# Patient Record
Sex: FEMALE | Race: WHITE | URBAN - METROPOLITAN AREA
[De-identification: names, ages, dates, MRNs, and addresses within clinical notes are randomized per-mention and may not be internally consistent; named-entity substitution may affect disease eponyms.]

---

## 2021-11-02 ENCOUNTER — OFFICE VISIT (OUTPATIENT)
Dept: PRIMARY CARE CLINIC | Age: 26
End: 2021-11-02
Payer: OTHER GOVERNMENT

## 2021-11-02 VITALS
HEART RATE: 73 BPM | WEIGHT: 134.6 LBS | DIASTOLIC BLOOD PRESSURE: 64 MMHG | OXYGEN SATURATION: 98 % | SYSTOLIC BLOOD PRESSURE: 104 MMHG | HEIGHT: 65 IN | BODY MASS INDEX: 22.42 KG/M2 | RESPIRATION RATE: 14 BRPM

## 2021-11-02 DIAGNOSIS — Z13.29 SCREENING FOR THYROID DISORDER: ICD-10-CM

## 2021-11-02 DIAGNOSIS — F41.9 ANXIETY: Primary | ICD-10-CM

## 2021-11-02 DIAGNOSIS — Z3A.01 LESS THAN 8 WEEKS GESTATION OF PREGNANCY: ICD-10-CM

## 2021-11-02 PROCEDURE — 99204 OFFICE O/P NEW MOD 45 MIN: CPT | Performed by: NURSE PRACTITIONER

## 2021-11-02 SDOH — ECONOMIC STABILITY: FOOD INSECURITY: WITHIN THE PAST 12 MONTHS, YOU WORRIED THAT YOUR FOOD WOULD RUN OUT BEFORE YOU GOT MONEY TO BUY MORE.: NEVER TRUE

## 2021-11-02 SDOH — ECONOMIC STABILITY: FOOD INSECURITY: WITHIN THE PAST 12 MONTHS, THE FOOD YOU BOUGHT JUST DIDN'T LAST AND YOU DIDN'T HAVE MONEY TO GET MORE.: NEVER TRUE

## 2021-11-02 ASSESSMENT — ENCOUNTER SYMPTOMS
BLOOD IN STOOL: 0
VOMITING: 0
TROUBLE SWALLOWING: 0
WHEEZING: 0
SHORTNESS OF BREATH: 0
SINUS PRESSURE: 0
COUGH: 0
CONSTIPATION: 0
SORE THROAT: 0
ABDOMINAL PAIN: 0
NAUSEA: 1
DIARRHEA: 0

## 2021-11-02 ASSESSMENT — PATIENT HEALTH QUESTIONNAIRE - PHQ9
2. FEELING DOWN, DEPRESSED OR HOPELESS: 0
SUM OF ALL RESPONSES TO PHQ QUESTIONS 1-9: 0
SUM OF ALL RESPONSES TO PHQ9 QUESTIONS 1 & 2: 0
SUM OF ALL RESPONSES TO PHQ QUESTIONS 1-9: 0
1. LITTLE INTEREST OR PLEASURE IN DOING THINGS: 0
SUM OF ALL RESPONSES TO PHQ QUESTIONS 1-9: 0

## 2021-11-02 ASSESSMENT — SOCIAL DETERMINANTS OF HEALTH (SDOH): HOW HARD IS IT FOR YOU TO PAY FOR THE VERY BASICS LIKE FOOD, HOUSING, MEDICAL CARE, AND HEATING?: NOT HARD AT ALL

## 2021-11-02 NOTE — PROGRESS NOTES
[address] PRIMARY CARE  [address]  [address]  [address] [address]  Dept: [phone]  Dept Fax: [phone]    Tristin Hassan is a 32 y.o. female who presents today for her medical conditions/complaints as noted below.   Tristin Hassan is c/o of  Chief Complaint   Patient presents with    New Patient     Establish Care, Discuss medication         HPI:     Here today to establish care as new patient  Has recently moved to the area from [place] due to spouse's job with [employer]  She is newly pregnant  Minimal past medical hx other than recent anxiety  Reports was put on lexapro 6 months ago for sx of anxiety and worry while  was out of the country on deployment  The medication was helpful, she recalls was on [facility] [place] dose\"  Stopped the lexapro 2 weeks ago when found out she was pregnant, she did not wean off of the medication  Was experiencing some withdrawal sx but thought was maybe just \"morning sickness\"  She states overall she feels better but remains with morning nausea and some tiredness  Denies any vomiting  Will eat toast or crackers first thing in the morning and this is helpful  She has appt with OBGYN office educator in 2 days, will see Dr in 2 weeks  This is her first pregnancy and she has several questions    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  Tries to follow healthy diet and keep active with regular exercise and walking          No results found for: LABA1C          ( goal A1C is < 7)   No results found for: LABMICR  No results found for: LDLCHOLESTEROL, [address]    (goal LDL is <100)   No results found for: AST, ALT, BUN  BP Readings from Last 3 Encounters:   21 104/64          ([id number])    Past Medical History:   Diagnosis Date    Anxiety       Past Surgical History:   Procedure Laterality Date    CYST REMOVAL  2016    on left hand    WISDOM TOOTH EXTRACTION  2019       Family History   Problem Relation Age of Onset    Anxiety Disorder Mother     Cancer Maternal Grandmother         Skin    Diabetes Maternal Grandfather     Diabetes Paternal Grandfather           Social History     Tobacco Use    Smoking status: Never Smoker    Smokeless tobacco: Never Used   Substance Use Topics    Alcohol use: Not on file     Comment: Occ. Current Outpatient Medications   Medication Sig Dispense Refill    Prenatal MV & Min w/FA-DHA ([facility] PRENATAL GUMMY PO) Take by mouth      Pyridoxine HCl (VITAMIN B6 PO) Take by mouth      Doxylamine Succinate, Sleep, (UNISOM PO) Take by mouth       No current facility-administered medications for this visit. No Known Allergies    Health Maintenance   Topic Date Due    Hepatitis C screen  Never done    Varicella vaccine (1 of 2 - 2-dose childhood series) Never done    HPV vaccine (1 - 2-dose series) Never done    HIV screen  Never done    DTaP/Tdap/Td vaccine (1 - Tdap) Never done    Pap smear  Never done    Flu vaccine (1) 11/02/2022 (Originally 9/1/2021)    COVID-19 Vaccine  Completed    Hepatitis A vaccine  Aged Out    Hepatitis B vaccine  Aged Out    Hib vaccine  Aged Out    Meningococcal (ACWY) vaccine  Aged Out    Pneumococcal 0-64 years Vaccine  Aged Out       Subjective:      Review of Systems   Constitutional: Negative for activity change, appetite change, chills, fatigue, fever and unexpected weight change. HENT: Negative for congestion, ear pain, hearing loss, sinus pressure, sore throat and trouble swallowing. Eyes: Negative for visual disturbance. Respiratory: Negative for cough, shortness of breath and wheezing. Cardiovascular: Negative for chest pain, palpitations and leg swelling. Gastrointestinal: Positive for nausea (due to pregnancy). Negative for abdominal pain, blood in stool, constipation, diarrhea and vomiting. Endocrine: Negative for cold intolerance, heat intolerance, polydipsia, polyphagia and polyuria.    Genitourinary: Negative for difficulty urinating, frequency, hematuria and urgency. Musculoskeletal: Negative for arthralgias and myalgias. Skin: Negative for rash. Allergic/Immunologic: Negative for environmental allergies. Neurological: Negative for dizziness, weakness, light-headedness and headaches. Psychiatric/Behavioral: Negative for confusion. The patient is not nervous/anxious. Objective:     Physical Exam  Constitutional:       Appearance: She is well-developed. HENT:      Head: Normocephalic. Eyes:      Conjunctiva/sclera: Conjunctivae normal.      Pupils: Pupils are equal, round, and reactive to light. Cardiovascular:      Rate and Rhythm: Normal rate and regular rhythm. Heart sounds: Normal heart sounds. No murmur heard. Pulmonary:      Effort: Pulmonary effort is normal.      Breath sounds: Normal breath sounds. No wheezing. Abdominal:      General: Bowel sounds are normal. There is no distension. Palpations: Abdomen is soft. Musculoskeletal:         General: Normal range of motion. Cervical back: Normal range of motion. Skin:     General: Skin is warm and dry. Neurological:      Mental Status: She is alert and oriented to person, place, and time. Psychiatric:         Behavior: Behavior normal.         Thought Content: Thought content normal.         Judgment: Judgment normal.       /64   Pulse 73   Resp 14   Ht 5' 5\" (1.651 m)   Wt 134 lb 9.6 oz (61.1 kg)   SpO2 98%   BMI 22.40 kg/m²     Assessment:       Diagnosis Orders   1. Anxiety     2. Less than 8 weeks gestation of pregnancy  CBC Auto Differential    Comprehensive Metabolic Panel    TSH without Reflex   3. Screening for thyroid disorder  TSH without Reflex             Plan:      Return if symptoms worsen or fail to improve.     Orders Placed This Encounter   Procedures    CBC Auto Differential     Standing Status:   Future     Standing Expiration Date:   11/2/2022    Comprehensive Metabolic Panel     Standing Status:   Future Standing Expiration Date:   11/2/2022    TSH without Reflex     Standing Status:   Future     Standing Expiration Date:   11/2/2022     Established care as new patient  She is interested in screening labs  Reviewed healthy diet choices and benefits of continuing regular exercise  Pregnant-she has appt later this week with new OBGYN office with their nurse educator and will see  in 2 weeks. questions answered, cont prenatal MVI  Anxiety-off lexapro, discussed resuming or trying zoloft is necessary. Advised to to discussed with OBGYN and return to office if decides tx if needed. At this time she states she feels her sx are not an issue, the anxiety was situational when she started the med and things have changed since then   Patient given educational materials - see patient instructions. Discussed use, benefit, and side effects of prescribed medications. All patientquestions answered. Pt voiced understanding. Reviewed health maintenance. Instructedto continue current medications, diet and exercise. Patient agreed with treatmentplan. Follow up as directed.      Electronicallysigned by ESTUARDO Mathur CNP on 11/2/2021 at 8:07 PM

## 2021-11-11 ENCOUNTER — TELEPHONE (OUTPATIENT)
Dept: PRIMARY CARE CLINIC | Age: 26
End: 2021-11-11

## 2021-11-11 DIAGNOSIS — Z3A.01 LESS THAN 8 WEEKS GESTATION OF PREGNANCY: Primary | ICD-10-CM

## 2021-11-11 NOTE — TELEPHONE ENCOUNTER
Referral placed to [address] [place] [address], Dr Pebbles Cerna, as this is who she told me she was already scheduled with. However she needs to understand the left her insurance company to determine their preferred providers and if this group is not on their list she will need to provide us with the name of the new group and we can then refer there.   Thanks

## 2021-11-11 NOTE — TELEPHONE ENCOUNTER
Patient called stating that she is having trouble with her [employer] She states that they will not allow the referral she received from the Urgent Care for OBGYN. She states she has an upcoming appointment and is asking if you are able to send a referral and see if they will approve it coming from her PCP.

## 2021-11-12 NOTE — TELEPHONE ENCOUNTER
Pt called back in regards to referral status. Advised that PCP placed a referral to Dr. Ghada Osei. Pt stated that they spoke with their insurance company yesterday and they told them that Dr. Ghada Osei is one their list as covered physician. Pt says they would need the referral faxed to [facility] and provided fax number [phone]. Referral faxed.

## 2021-11-15 LAB — TSH SERPL DL<=0.05 MIU/L-ACNC: 1.63 UIU/ML

## 2021-11-16 DIAGNOSIS — Z13.29 SCREENING FOR THYROID DISORDER: ICD-10-CM

## 2021-11-16 DIAGNOSIS — Z3A.01 LESS THAN 8 WEEKS GESTATION OF PREGNANCY: ICD-10-CM

## 2022-08-11 ENCOUNTER — OFFICE VISIT (OUTPATIENT)
Dept: PRIMARY CARE CLINIC | Age: 27
End: 2022-08-11
Payer: OTHER GOVERNMENT

## 2022-08-11 VITALS
WEIGHT: 152.8 LBS | DIASTOLIC BLOOD PRESSURE: 72 MMHG | BODY MASS INDEX: 25.43 KG/M2 | RESPIRATION RATE: 16 BRPM | SYSTOLIC BLOOD PRESSURE: 116 MMHG | HEART RATE: 70 BPM | OXYGEN SATURATION: 98 %

## 2022-08-11 DIAGNOSIS — M25.531 PAIN IN BOTH WRISTS: Primary | ICD-10-CM

## 2022-08-11 DIAGNOSIS — R49.9 CHANGE IN VOICE: ICD-10-CM

## 2022-08-11 DIAGNOSIS — M25.532 PAIN IN BOTH WRISTS: Primary | ICD-10-CM

## 2022-08-11 DIAGNOSIS — L70.0 CYSTIC ACNE: ICD-10-CM

## 2022-08-11 DIAGNOSIS — K21.9 GASTROESOPHAGEAL REFLUX DISEASE, UNSPECIFIED WHETHER ESOPHAGITIS PRESENT: ICD-10-CM

## 2022-08-11 PROCEDURE — 99214 OFFICE O/P EST MOD 30 MIN: CPT | Performed by: NURSE PRACTITIONER

## 2022-08-11 RX ORDER — CLINDAMYCIN AND BENZOYL PEROXIDE 10; 50 MG/G; MG/G
GEL TOPICAL
Qty: 35 G | Refills: 2 | Status: SHIPPED | OUTPATIENT
Start: 2022-08-11

## 2022-08-11 RX ORDER — SERTRALINE HYDROCHLORIDE 25 MG/1
25 TABLET, FILM COATED ORAL DAILY
COMMUNITY
Start: 2022-07-15

## 2022-08-11 RX ORDER — LORATADINE 10 MG/1
10 TABLET ORAL DAILY
COMMUNITY

## 2022-08-11 RX ORDER — OMEPRAZOLE 20 MG/1
20 CAPSULE, DELAYED RELEASE ORAL 2 TIMES DAILY
Qty: 60 CAPSULE | Refills: 5 | Status: SHIPPED | OUTPATIENT
Start: 2022-08-11

## 2022-08-11 ASSESSMENT — ENCOUNTER SYMPTOMS
NAUSEA: 0
COUGH: 0
DIARRHEA: 0
SORE THROAT: 0
WHEEZING: 0
CONSTIPATION: 0
ABDOMINAL PAIN: 0
BLOOD IN STOOL: 0
SHORTNESS OF BREATH: 0
SINUS PRESSURE: 0
VOICE CHANGE: 1
VOMITING: 0
TROUBLE SWALLOWING: 0

## 2022-08-11 ASSESSMENT — PATIENT HEALTH QUESTIONNAIRE - PHQ9
SUM OF ALL RESPONSES TO PHQ QUESTIONS 1-9: 1
SUM OF ALL RESPONSES TO PHQ9 QUESTIONS 1 & 2: 1
SUM OF ALL RESPONSES TO PHQ QUESTIONS 1-9: 1
2. FEELING DOWN, DEPRESSED OR HOPELESS: 1
SUM OF ALL RESPONSES TO PHQ QUESTIONS 1-9: 1
SUM OF ALL RESPONSES TO PHQ QUESTIONS 1-9: 1
1. LITTLE INTEREST OR PLEASURE IN DOING THINGS: 0

## 2022-08-11 NOTE — PROGRESS NOTES
TOOTH EXTRACTION  2019       Family History   Problem Relation Age of Onset    Anxiety Disorder Mother     Cancer Maternal Grandmother         Skin    Diabetes Maternal Grandfather     Diabetes Paternal Grandfather           Social History     Tobacco Use    Smoking status: Never    Smokeless tobacco: Never   Substance Use Topics    Alcohol use: Not on file     Comment: Occ. Current Outpatient Medications   Medication Sig Dispense Refill    sertraline (ZOLOFT) 25 MG tablet Take 25 mg by mouth in the morning. loratadine (CLARITIN) 10 MG tablet Take 10 mg by mouth in the morning. omeprazole (PRILOSEC) 20 MG delayed release capsule Take 1 capsule by mouth in the morning and at bedtime 60 capsule 5    clindamycin-benzoyl peroxide (BENZACLIN) 1-5 % gel Apply topically 2 times daily. 35 g 2    Prenatal MV & Min w/FA-DHA ([facility] PRENATAL GUMMY PO) Take by mouth       No current facility-administered medications for this visit. No Known Allergies    Health Maintenance   Topic Date Due    Varicella vaccine (1 of 2 - 2-dose childhood series) Never done    HPV vaccine (1 - 2-dose series) Never done    HIV screen  Never done    Hepatitis C screen  Never done    Pap smear  Never done    Flu vaccine (1) 09/01/2022    Depression Screen  11/02/2022    DTaP/Tdap/Td vaccine (2 - Td or Tdap) 04/05/2032    COVID-19 Vaccine  Completed    Hepatitis A vaccine  Aged Out    Hepatitis B vaccine  Aged Out    Hib vaccine  Aged Out    Meningococcal (ACWY) vaccine  Aged Out    Pneumococcal 0-64 years Vaccine  Aged Out       Subjective:      Review of Systems   Constitutional:  Negative for activity change, appetite change, chills, fatigue, fever and unexpected weight change. HENT:  Positive for voice change. Negative for congestion, ear pain, hearing loss, sinus pressure, sore throat and trouble swallowing. Eyes:  Negative for visual disturbance. Respiratory:  Negative for cough, shortness of breath and wheezing. Cardiovascular:  Negative for chest pain, palpitations and leg swelling. Gastrointestinal:  Negative for abdominal pain, blood in stool, constipation, diarrhea, nausea and vomiting. Dyspepsia   Endocrine: Negative for cold intolerance, heat intolerance, polydipsia, polyphagia and polyuria. Genitourinary:  Negative for difficulty urinating, frequency, hematuria and urgency. Musculoskeletal:  Positive for arthralgias (Bilateral wrist). Negative for myalgias. Skin:  Negative for rash. Acne, cystic   Allergic/Immunologic: Negative for environmental allergies. Neurological:  Negative for dizziness, weakness, light-headedness and headaches. Psychiatric/Behavioral:  Negative for confusion. The patient is not nervous/anxious. Objective:     Physical Exam  Constitutional:       Appearance: Normal appearance. She is well-developed. HENT:      Head: Normocephalic. Eyes:      Conjunctiva/sclera: Conjunctivae normal.      Pupils: Pupils are equal, round, and reactive to light. Cardiovascular:      Rate and Rhythm: Normal rate and regular rhythm. Heart sounds: Normal heart sounds. No murmur heard. Pulmonary:      Effort: Pulmonary effort is normal.      Breath sounds: Normal breath sounds. No wheezing. Abdominal:      General: Bowel sounds are normal. There is no distension. Palpations: Abdomen is soft. Musculoskeletal:         General: Normal range of motion. Right hand: Tenderness (Medial aspect wrist) present. Normal strength. Left hand: Tenderness (Medial aspect wrist) present. Normal strength. Cervical back: Normal range of motion. Skin:     General: Skin is warm and dry. Comments: Scattered pustules bilateral cheeks and chin with few large cystic-appearing lesions   Neurological:      Mental Status: She is alert and oriented to person, place, and time. Psychiatric:         Behavior: Behavior normal.         Thought Content:  Thought content normal. Judgment: Judgment normal.     /72   Pulse 70   Resp 16   Wt 152 lb 12.8 oz (69.3 kg)   SpO2 98%   BMI 25.43 kg/m²     Assessment:       Diagnosis Orders   1. Pain in both wrists  [facility]      2. Change in voice  DEREK Field MD, OtolaryngologyZac    omeprazole (PRILOSEC) 20 MG delayed release capsule      3. Gastroesophageal reflux disease, unspecified whether esophagitis present  omeprazole (PRILOSEC) 20 MG delayed release capsule      4. Cystic acne  clindamycin-benzoyl peroxide (BENZACLIN) 1-5 % gel                Plan:      Return if symptoms worsen or fail to improve. Wrist pain-suspect due to increased lifting with new baby, discussed diagnostics with patient but she is agreeable with starting with occupational therapy for strengthening and stretching. Will consider imaging and labs to rule out rheumatological disorder if pain persists or worsens after therapy  Change in voice, GERD-strong suspicion for reflux contributing to vocal cord inflammation, advised starting omeprazole twice daily for 30 days then decrease use to daily and as needed. If resolves with PPI no need for ENT referral placed for persistent problems  Cystic acne-continue current acne cleanser and moisturizer, add BenzaClin gel. Discussed oral antibiotics not best option as she is still breast-feeding.   If problem persists encouraged evaluation with dermatology  Orders Placed This Encounter   Procedures    [facility]     Referral Priority:   Routine     Referral Type:   Eval and Treat     Referral Reason:   Specialty Services Required     Requested Specialty:   Occupational Therapy     Number of Visits Requested:   1    [facility]     Referral Priority:   Routine     Referral Type:   Eval and Treat     Referral Reason:   Specialty Services Required     Referred to Provider:   Jennifer Sun MD Requested Specialty:   Otolaryngology     Number of Visits Requested:   1        Orders Placed This Encounter   Medications    omeprazole (PRILOSEC) 20 MG delayed release capsule     Sig: Take 1 capsule by mouth in the morning and at bedtime     Dispense:  60 capsule     Refill:  5    clindamycin-benzoyl peroxide (BENZACLIN) 1-5 % gel     Sig: Apply topically 2 times daily. Dispense:  35 g     Refill:  2       Patient given educational materials - see patient instructions. Discussed use, benefit, and side effects of prescribed medications. All patientquestions answered. Pt voiced understanding. Reviewed health maintenance. Instructedto continue current medications, diet and exercise. Patient agreed with treatmentplan. Follow up as directed.      Electronicallysigned by ESTUARDO Quesada CNP on 8/11/2022 at 3:43 PM